# Patient Record
Sex: FEMALE | Race: WHITE | NOT HISPANIC OR LATINO | Employment: STUDENT | RURAL
[De-identification: names, ages, dates, MRNs, and addresses within clinical notes are randomized per-mention and may not be internally consistent; named-entity substitution may affect disease eponyms.]

---
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
## 2021-07-19 ENCOUNTER — IMMUNIZATION (OUTPATIENT)
Dept: VACCINE CLINIC | Facility: HOSPITAL | Age: 21
End: 2021-07-19

## 2021-07-19 PROCEDURE — 0001A: CPT | Performed by: INTERNAL MEDICINE

## 2021-07-19 PROCEDURE — 91300 HC SARSCOV02 VAC 30MCG/0.3ML IM: CPT | Performed by: INTERNAL MEDICINE

## 2023-04-18 ENCOUNTER — OFFICE VISIT (OUTPATIENT)
Dept: FAMILY MEDICINE CLINIC | Facility: CLINIC | Age: 23
End: 2023-04-18
Payer: COMMERCIAL

## 2023-04-18 VITALS
DIASTOLIC BLOOD PRESSURE: 80 MMHG | TEMPERATURE: 98.3 F | BODY MASS INDEX: 20.73 KG/M2 | SYSTOLIC BLOOD PRESSURE: 105 MMHG | WEIGHT: 121.4 LBS | OXYGEN SATURATION: 98 % | HEIGHT: 64 IN | HEART RATE: 85 BPM

## 2023-04-18 DIAGNOSIS — R11.2 NAUSEA AND VOMITING, UNSPECIFIED VOMITING TYPE: Primary | ICD-10-CM

## 2023-04-18 DIAGNOSIS — Z00.00 HEALTH MAINTENANCE EXAMINATION: ICD-10-CM

## 2023-04-18 RX ORDER — CHLORHEXIDINE GLUCONATE 0.12 MG/ML
RINSE ORAL
COMMUNITY
Start: 2023-02-15

## 2023-04-18 RX ORDER — ONDANSETRON 4 MG/1
4 TABLET, ORALLY DISINTEGRATING ORAL EVERY 8 HOURS PRN
Qty: 20 TABLET | Refills: 0 | Status: SHIPPED | OUTPATIENT
Start: 2023-04-18

## 2023-04-18 NOTE — LETTER
April 18, 2023     Patient: Raiza Mohr   YOB: 2000   Date of Visit: 4/18/2023       To Whom it May Concern:    Raiza Mohr was seen in my clinic on 4/18/2023. She  may return to school in one day.           Sincerely,          MARIOLA Amin        CC: No Recipients

## 2023-04-18 NOTE — PROGRESS NOTES
"    Subjective        Chief Complaint  Vomiting and Excessive Sweating    Subjective      History of Present Illness  Raiza Mohr is a 22 y.o. female who presents today to [facility] for Vomiting and Excessive Sweating. She has a past medical history of Anxiety, mild intermittent asthma, Depression.    Vomiting and Excessive Sweating:  Raiza reports an episode of nausea/vomiting x4 associated with sweating this morning. She took zofran with improvement in her symptoms. She has some mild epigastric soreness after this episode. No further vomiting. Sweating resolved after the episode of vomiting. No fever, chills, changes in bowel habits, or dysuria, urgency/frequency. No new medications. No known sick contacts. Denies any way she could be pregnant and she just got off of her menstrual cycle 2 days ago. She did eat some BBQ chicken yesterday and an uncrustable this morning. Denies any known undercooked meats, sushi, or other questionable foods. She does report having some intermittent nausea if she eats dairy, bread, or spicy/greasy foods. She has always questioned if she may be intolerant to dairy.        Current Outpatient Medications:   •  chlorhexidine (PERIDEX) 0.12 % solution, , Disp: , Rfl:   •  Sprintec 28 0.25-35 MG-MCG per tablet, , Disp: , Rfl:   •  ondansetron ODT (ZOFRAN-ODT) 4 MG disintegrating tablet, Place 1 tablet on the tongue Every 8 (Eight) Hours As Needed for Nausea or Vomiting., Disp: 20 tablet, Rfl: 0      No Known Allergies    Objective     Objective   Vital Signs:  Blood Pressure 105/80   Pulse 85   Temperature 98.3 °F (36.8 °C) (Temporal)   Height 162.6 cm (64\")   Weight 55.1 kg (121 lb 6.4 oz)   Oxygen Saturation 98%   Body Mass Index 20.84 kg/m²   Estimated body mass index is 20.84 kg/m² as calculated from the following:    Height as of this encounter: 162.6 cm (64\").    Weight as of this encounter: 55.1 kg (121 lb 6.4 oz).    BMI is within normal " parameters. No other follow-up for BMI required.    Past Medical History:   Diagnosis Date   • Allergic    • Anxiety    • Asthma    • Bronchitis    • Depression    • History of strep sore throat    • History of strep sore throat    • Hypertension      History reviewed. No pertinent surgical history.  Social History     Socioeconomic History   • Marital status: Single   Tobacco Use   • Smoking status: Never     Passive exposure: Yes   • Smokeless tobacco: Never   Substance and Sexual Activity   • Alcohol use: No   • Drug use: No   • Sexual activity: Never     Birth control/protection: Abstinence      Physical Exam  Vitals and nursing note reviewed.   Constitutional:       General: She is not in acute distress.     Appearance: She is well-developed. She is not diaphoretic.   HENT:      Head: Normocephalic and atraumatic.      Nose: No congestion or rhinorrhea.      Mouth/Throat:      Pharynx: No oropharyngeal exudate or posterior oropharyngeal erythema.   Eyes:      General: No scleral icterus.        Right eye: No discharge.         Left eye: No discharge.      Conjunctiva/sclera: Conjunctivae normal.   Neck:      Thyroid: No thyroid mass, thyromegaly or thyroid tenderness.   Cardiovascular:      Rate and Rhythm: Normal rate and regular rhythm.      Heart sounds: Normal heart sounds. No murmur heard.    No friction rub. No gallop.   Pulmonary:      Effort: Pulmonary effort is normal. No respiratory distress.      Breath sounds: Normal breath sounds. No wheezing or rales.   Chest:      Chest wall: No tenderness.   Musculoskeletal:         General: Normal range of motion.      Cervical back: Normal range of motion and neck supple. No tenderness.   Lymphadenopathy:      Cervical: No cervical adenopathy.   Skin:     General: Skin is warm and dry.      Coloration: Skin is not pale.      Findings: No erythema or rash.   Neurological:      Mental Status: She is alert and oriented to person, place, and time.   Psychiatric:          Behavior: Behavior normal.        Result Review :  The following data was reviewed by: MARIOLA Amin on 04/18/2023:  No results found for: CBCDIFFPANEL, CMP, HGBA1C, TSH, HDL, LDL, TRIG, CHLPL, [id number]      Assessment / Plan         Assessment   Diagnoses and all orders for this visit:    1. Nausea and vomiting, unspecified vomiting type (Primary)  • No concerning findings on exam.   • Differential includes viral gastroenteritis, food poisoning, or underlying gallbladder dysfunction.   • Continue supportive care. Platte diet, increase fluids as tolerated.   • New Rx for zofran ODT sent to pharmacy.   • Will plan for routine labs as well as amylase, lipase, and lactose tolerance test within the next week.   • Will also obtain US of the gallbladder. Doubt acute cholecystitis at this time.   • She was encouraged to return to clinic if her symptoms worsen or fail to resolve.   -     US Gallbladder; Future  -     Amylase; Future  -     Lipase; Future  -     Lactose Tolerance Test; Future  -     ondansetron ODT (ZOFRAN-ODT) 4 MG disintegrating tablet; Place 1 tablet on the tongue Every 8 (Eight) Hours As Needed for Nausea or Vomiting.  Dispense: 20 tablet; Refill: 0    2. Health maintenance examination  -     CBC & Differential; Future  -     Comprehensive Metabolic Panel; Future  -     Lipid Panel; Future  -     TSH; Future  -     Hemoglobin A1c; Future  -     Hepatitis C Antibody; Future       New Medications Ordered This Visit   Medications   • ondansetron ODT (ZOFRAN-ODT) 4 MG disintegrating tablet     Sig: Place 1 tablet on the tongue Every 8 (Eight) Hours As Needed for Nausea or Vomiting.     Dispense:  20 tablet     Refill:  0     Health Maintenance  • Consider pap smear if not performed elsewhere in the past 3 years.     Follow Up   Return in about 1 month (around 5/18/2023) for Recheck nausea.    Patient was given instructions and counseling regarding her condition or for health maintenance advice.  Please see specific information pulled into the AVS if appropriate.       This document has been electronically signed by MARIOLA Amin   April 18, 2023 14:24 EDT    Dictated Utilizing Dragon Dictation: Part of this note may be an electronic transcription/translation of spoken language to printed text using the Dragon Dictation System.

## 2023-04-28 ENCOUNTER — PATIENT ROUNDING (BHMG ONLY) (OUTPATIENT)
Dept: FAMILY MEDICINE CLINIC | Facility: CLINIC | Age: 23
End: 2023-04-28
Payer: COMMERCIAL

## 2023-04-28 NOTE — PROGRESS NOTES
April 28, 2023    Hello, may I speak with Raiza Mohr?    My name is Kriss Flanagan    I am  with [facility]  [address] [place] [id number] [phone].    Before we get started may I verify your date of birth? 2000    I am calling to officially welcome you to our practice and ask about your recent visit. Is this a good time to talk? yes    Tell me about your visit with us. What things went well?  I had a good appointment       We're always looking for ways to make our patients' experiences even better. Do you have recommendations on ways we may improve?  no    Overall were you satisfied with your first visit to our practice? yes       I appreciate you taking the time to speak with me today. Is there anything else I can do for you? no      Thank you, and have a great day.